# Patient Record
Sex: FEMALE | Race: WHITE | URBAN - METROPOLITAN AREA
[De-identification: names, ages, dates, MRNs, and addresses within clinical notes are randomized per-mention and may not be internally consistent; named-entity substitution may affect disease eponyms.]

---

## 2020-08-19 ENCOUNTER — OFFICE VISIT (OUTPATIENT)
Dept: URBAN - METROPOLITAN AREA TELEHEALTH 2 | Facility: TELEHEALTH | Age: 70
End: 2020-08-19
Payer: MEDICARE

## 2020-08-19 DIAGNOSIS — K21.9 GERD: ICD-10-CM

## 2020-08-19 DIAGNOSIS — K63.89 BACTERIAL OVERGROWTH SYNDROME: ICD-10-CM

## 2020-08-19 DIAGNOSIS — Z85.038 PERSONAL HISTORY OF COLON CANCER: ICD-10-CM

## 2020-08-19 PROCEDURE — 99442 PHONE E/M BY PHYS 11-20 MIN: CPT | Performed by: INTERNAL MEDICINE

## 2020-08-19 PROCEDURE — 99202 OFFICE O/P NEW SF 15 MIN: CPT | Performed by: INTERNAL MEDICINE

## 2020-08-19 RX ORDER — DEXLANSOPRAZOLE 60 MG/1
TAKE 1 CAPSULE BY ORAL ROUTE DAILY FOR 90 DAYS CAPSULE, DELAYED RELEASE ORAL 1
Qty: 90 | Refills: 4 | Status: ACTIVE | COMMUNITY
Start: 2019-11-21 | End: 2021-02-13

## 2020-08-19 RX ORDER — BUPROPION HCL 300 MG
TAKE 1 TABLET (300 MG) BY ORAL ROUTE ONCE DAILY TABLET, EXTENDED RELEASE 24 HR ORAL 1
Qty: 0 | Refills: 0 | Status: ACTIVE | COMMUNITY
Start: 1900-01-01

## 2020-08-19 RX ORDER — ATORVASTATIN CALCIUM 20 MG/1
TABLET, FILM COATED ORAL
Qty: 0 | Refills: 0 | Status: ACTIVE | COMMUNITY
Start: 1900-01-01

## 2020-08-19 RX ORDER — RAMIPRIL 10 MG/1
CAPSULE ORAL
Qty: 0 | Refills: 0 | Status: ACTIVE | COMMUNITY
Start: 1900-01-01

## 2020-08-19 RX ORDER — AMOXICILLIN AND CLAVULANATE POTASSIUM 875; 125 MG/1; MG/1
1 TABLET TABLET, FILM COATED ORAL
Qty: 20 | OUTPATIENT
Start: 2020-08-19 | End: 2020-08-29

## 2020-08-19 NOTE — HPI-TODAY'S VISIT:
70-years-old female presented for evaluation of worsening bloating postprandial epigastric discomfort and Gas, symptoms has worsened over the last 2 years after her right urszula colectomy for colon cancer , I diagnosed her with bacterial overgrowth she has trial of Xifaxan which helped her bloating but she could not tolerate it, she continued to have the symptoms, she has history of Nissen fundoplication twice she has constant intermittent pain in the epigastric area, her last endoscopy revealed looseness and fundoplication, her colonoscopy march 2019 whichw as unremarkable excpet for of previous surgery ( right hemicolectomy )

## 2020-08-26 ENCOUNTER — LAB OUTSIDE AN ENCOUNTER (OUTPATIENT)
Dept: URBAN - METROPOLITAN AREA CLINIC 92 | Facility: CLINIC | Age: 70
End: 2020-08-26

## 2020-08-26 ENCOUNTER — TELEPHONE ENCOUNTER (OUTPATIENT)
Dept: URBAN - METROPOLITAN AREA CLINIC 92 | Facility: CLINIC | Age: 70
End: 2020-08-26

## 2020-08-31 ENCOUNTER — TELEPHONE ENCOUNTER (OUTPATIENT)
Dept: URBAN - METROPOLITAN AREA CLINIC 92 | Facility: CLINIC | Age: 70
End: 2020-08-31

## 2020-08-31 RX ORDER — RIFAXIMIN 550 MG/1
1 TABLET TABLET ORAL THREE TIMES A DAY
Qty: 42 TABLET | OUTPATIENT
Start: 2020-08-31 | End: 2020-09-14

## 2020-11-25 ENCOUNTER — TELEPHONE ENCOUNTER (OUTPATIENT)
Dept: URBAN - METROPOLITAN AREA TELEHEALTH 2 | Facility: TELEHEALTH | Age: 70
End: 2020-11-25

## 2020-11-25 RX ORDER — ONDANSETRON HYDROCHLORIDE 4 MG/1
1 TABLET TABLET, FILM COATED ORAL EVERY 6 HOURS
Qty: 120 TABLET | Refills: 2 | OUTPATIENT
Start: 2020-11-27

## 2021-02-08 ENCOUNTER — ERX REFILL RESPONSE (OUTPATIENT)
Age: 71
End: 2021-02-08

## 2021-02-08 RX ORDER — DEXLANSOPRAZOLE 60 MG/1
TAKE 1 CAPSULE BY MOUTH EVERY DAY CAPSULE, DELAYED RELEASE ORAL
Qty: 90 | Refills: 4

## 2021-03-01 ENCOUNTER — ERX REFILL RESPONSE (OUTPATIENT)
Dept: URBAN - METROPOLITAN AREA CLINIC 23 | Facility: CLINIC | Age: 71
End: 2021-03-01

## 2021-03-01 RX ORDER — FAMOTIDINE 20 MG/1
TAKE 1 TABLET BY MOUTH TWICE A DAY TABLET, FILM COATED ORAL
Qty: 180 | Refills: 1

## 2021-07-02 ENCOUNTER — TELEPHONE ENCOUNTER (OUTPATIENT)
Dept: URBAN - METROPOLITAN AREA CLINIC 23 | Facility: CLINIC | Age: 71
End: 2021-07-02

## 2021-07-02 RX ORDER — SODIUM PICOSULFATE, MAGNESIUM OXIDE, AND ANHYDROUS CITRIC ACID 10; 3.5; 12 MG/160ML; G/160ML; G/160ML
160 ML LIQUID ORAL
Qty: 320 MILLILITER | Refills: 0 | OUTPATIENT
Start: 2021-07-06 | End: 2021-07-07

## 2021-07-05 ENCOUNTER — LAB OUTSIDE AN ENCOUNTER (OUTPATIENT)
Dept: URBAN - METROPOLITAN AREA CLINIC 23 | Facility: CLINIC | Age: 71
End: 2021-07-05

## 2021-07-09 ENCOUNTER — OFFICE VISIT (OUTPATIENT)
Dept: URBAN - METROPOLITAN AREA MEDICAL CENTER 27 | Facility: MEDICAL CENTER | Age: 71
End: 2021-07-09
Payer: MEDICARE

## 2021-07-09 DIAGNOSIS — R93.3 ABN FINDINGS-GI TRACT: ICD-10-CM

## 2021-07-09 DIAGNOSIS — C18.6 MALIGNANT NEOPLASM OF DESCENDING COLON: ICD-10-CM

## 2021-07-09 DIAGNOSIS — Z85.038 H/O COLON CANCER, STAGE I: ICD-10-CM

## 2021-07-09 PROCEDURE — 45331 SIGMOIDOSCOPY AND BIOPSY: CPT | Performed by: INTERNAL MEDICINE

## 2021-07-09 PROCEDURE — 45335 SIGMOIDOSCOPY W/SUBMUC INJ: CPT | Performed by: INTERNAL MEDICINE

## 2021-07-09 RX ORDER — ATORVASTATIN CALCIUM 20 MG/1
TABLET, FILM COATED ORAL
Qty: 0 | Refills: 0 | Status: ACTIVE | COMMUNITY
Start: 1900-01-01

## 2021-07-09 RX ORDER — DEXLANSOPRAZOLE 60 MG/1
TAKE 1 CAPSULE BY MOUTH EVERY DAY CAPSULE, DELAYED RELEASE ORAL
Qty: 90 | Refills: 4 | Status: ACTIVE | COMMUNITY

## 2021-07-09 RX ORDER — BUPROPION HCL 300 MG
TAKE 1 TABLET (300 MG) BY ORAL ROUTE ONCE DAILY TABLET, EXTENDED RELEASE 24 HR ORAL 1
Qty: 0 | Refills: 0 | Status: ACTIVE | COMMUNITY
Start: 1900-01-01

## 2021-07-09 RX ORDER — FAMOTIDINE 20 MG/1
TAKE 1 TABLET BY MOUTH TWICE A DAY TABLET, FILM COATED ORAL
Qty: 180 | Refills: 1 | Status: ACTIVE | COMMUNITY

## 2021-07-09 RX ORDER — ONDANSETRON HYDROCHLORIDE 4 MG/1
1 TABLET TABLET, FILM COATED ORAL EVERY 6 HOURS
Qty: 120 TABLET | Refills: 2 | Status: ACTIVE | COMMUNITY
Start: 2020-11-27

## 2021-07-09 RX ORDER — RAMIPRIL 10 MG/1
CAPSULE ORAL
Qty: 0 | Refills: 0 | Status: ACTIVE | COMMUNITY
Start: 1900-01-01

## 2021-07-13 ENCOUNTER — TELEPHONE ENCOUNTER (OUTPATIENT)
Dept: URBAN - METROPOLITAN AREA CLINIC 23 | Facility: CLINIC | Age: 71
End: 2021-07-13

## 2021-09-07 ENCOUNTER — ERX REFILL RESPONSE (OUTPATIENT)
Dept: URBAN - METROPOLITAN AREA CLINIC 23 | Facility: CLINIC | Age: 71
End: 2021-09-07

## 2021-09-07 RX ORDER — FAMOTIDINE 20 MG/1
TAKE 1 TABLET BY MOUTH TWICE A DAY TABLET, FILM COATED ORAL
Qty: 180 TABLET | Refills: 2 | OUTPATIENT

## 2021-09-07 RX ORDER — FAMOTIDINE 20 MG/1
TAKE 1 TABLET BY MOUTH TWICE A DAY TABLET, FILM COATED ORAL
Qty: 180 | Refills: 1 | OUTPATIENT

## 2021-12-15 ENCOUNTER — TELEPHONE ENCOUNTER (OUTPATIENT)
Dept: URBAN - METROPOLITAN AREA CLINIC 23 | Facility: CLINIC | Age: 71
End: 2021-12-15

## 2021-12-15 RX ORDER — ONDANSETRON HYDROCHLORIDE 4 MG/1
1 TABLET TABLET, FILM COATED ORAL EVERY 6 HOURS
Qty: 360 TABLET | Refills: 1
Start: 2021-12-15

## 2022-02-15 ENCOUNTER — TELEPHONE ENCOUNTER (OUTPATIENT)
Dept: URBAN - METROPOLITAN AREA CLINIC 23 | Facility: CLINIC | Age: 72
End: 2022-02-15

## 2022-02-15 RX ORDER — DEXLANSOPRAZOLE 60 MG/1
TAKE 1 CAPSULE BY MOUTH EVERY DAY CAPSULE, DELAYED RELEASE ORAL
Qty: 90 | Refills: 4

## 2022-05-20 ENCOUNTER — TELEPHONE ENCOUNTER (OUTPATIENT)
Dept: URBAN - METROPOLITAN AREA CLINIC 111 | Facility: CLINIC | Age: 72
End: 2022-05-20

## 2022-05-20 RX ORDER — PANTOPRAZOLE SODIUM 40 MG/1
1 TABLET TABLET, DELAYED RELEASE ORAL TWICE A DAY
Qty: 60 TABLET | Refills: 2 | OUTPATIENT

## 2022-05-20 RX ORDER — FAMOTIDINE 20 MG/1
1 TABLET AT BEDTIME TABLET, FILM COATED ORAL ONCE A DAY
Qty: 30 | Refills: 2 | OUTPATIENT
Start: 2022-05-20

## 2022-06-14 ENCOUNTER — TELEPHONE ENCOUNTER (OUTPATIENT)
Dept: URBAN - METROPOLITAN AREA CLINIC 23 | Facility: CLINIC | Age: 72
End: 2022-06-14

## 2022-06-14 RX ORDER — PANTOPRAZOLE SODIUM 40 MG/1
1 TABLET TABLET, DELAYED RELEASE ORAL TWICE A DAY
Qty: 180 | Refills: 3
Start: 2022-06-14

## 2022-07-13 ENCOUNTER — LAB OUTSIDE AN ENCOUNTER (OUTPATIENT)
Dept: URBAN - METROPOLITAN AREA CLINIC 111 | Facility: CLINIC | Age: 72
End: 2022-07-13

## 2022-07-13 ENCOUNTER — OFFICE VISIT (OUTPATIENT)
Dept: URBAN - METROPOLITAN AREA CLINIC 111 | Facility: CLINIC | Age: 72
End: 2022-07-13
Payer: MEDICARE

## 2022-07-13 VITALS
WEIGHT: 125.6 LBS | DIASTOLIC BLOOD PRESSURE: 61 MMHG | TEMPERATURE: 98.1 F | HEART RATE: 81 BPM | SYSTOLIC BLOOD PRESSURE: 129 MMHG | HEIGHT: 63 IN | BODY MASS INDEX: 22.25 KG/M2

## 2022-07-13 DIAGNOSIS — Z85.038 PERSONAL HISTORY OF COLON CANCER: ICD-10-CM

## 2022-07-13 DIAGNOSIS — K21.9 GERD: ICD-10-CM

## 2022-07-13 DIAGNOSIS — R12 HEARTBURN: ICD-10-CM

## 2022-07-13 PROCEDURE — 99214 OFFICE O/P EST MOD 30 MIN: CPT | Performed by: INTERNAL MEDICINE

## 2022-07-13 RX ORDER — RAMIPRIL 10 MG/1
CAPSULE ORAL
Qty: 0 | Refills: 0 | Status: ACTIVE | COMMUNITY
Start: 1900-01-01

## 2022-07-13 RX ORDER — FAMOTIDINE 20 MG/1
1 TABLET AT BEDTIME TABLET, FILM COATED ORAL ONCE A DAY
Qty: 30 | Refills: 2 | Status: ACTIVE | COMMUNITY
Start: 2022-05-20

## 2022-07-13 RX ORDER — ATORVASTATIN CALCIUM 20 MG/1
TABLET, FILM COATED ORAL
Qty: 0 | Refills: 0 | Status: ACTIVE | COMMUNITY
Start: 1900-01-01

## 2022-07-13 RX ORDER — ONDANSETRON HYDROCHLORIDE 4 MG/1
1 TABLET TABLET, FILM COATED ORAL EVERY 6 HOURS
Qty: 360 TABLET | Refills: 1 | Status: ACTIVE | COMMUNITY
Start: 2021-12-15

## 2022-07-13 RX ORDER — BUPROPION HCL 300 MG
TAKE 1 TABLET (300 MG) BY ORAL ROUTE ONCE DAILY TABLET, EXTENDED RELEASE 24 HR ORAL 1
Qty: 0 | Refills: 0 | Status: ACTIVE | COMMUNITY
Start: 1900-01-01

## 2022-07-13 RX ORDER — PANTOPRAZOLE SODIUM 40 MG/1
1 TABLET TABLET, DELAYED RELEASE ORAL TWICE A DAY
Qty: 180 | Refills: 3
Start: 2022-06-14

## 2022-07-13 RX ORDER — DEXLANSOPRAZOLE 60 MG/1
TAKE 1 CAPSULE BY MOUTH EVERY DAY CAPSULE, DELAYED RELEASE ORAL
Qty: 90 | Refills: 4 | Status: ACTIVE | COMMUNITY

## 2022-07-13 RX ORDER — PANTOPRAZOLE SODIUM 40 MG/1
1 TABLET TABLET, DELAYED RELEASE ORAL TWICE A DAY
Qty: 180 | Refills: 3 | Status: ACTIVE | COMMUNITY
Start: 2022-06-14

## 2022-07-13 RX ORDER — FAMOTIDINE 20 MG/1
TAKE 1 TABLET BY MOUTH TWICE A DAY TABLET, FILM COATED ORAL
Qty: 180 TABLET | Refills: 2 | Status: ACTIVE | COMMUNITY

## 2022-07-13 RX ORDER — FAMOTIDINE 20 MG/1
1 TABLET AT BEDTIME TABLET, FILM COATED ORAL ONCE A DAY
Qty: 30 | Refills: 2
Start: 2022-05-20

## 2022-07-13 NOTE — HPI-TODAY'S VISIT:
72-years-old female presented for worsening grief symptoms heartburn regurgitation and burning sensation in the substernal area she also wants to follow-up for colon cancer.  The patient was diagnosed with colon cancer first in 2018 had first right hemicolectomy last year in July 2021 had another cancer in the descending colon she required a subtotal colectomy, after the surgery she had a prolonged hospitalization.  She got chemotherapy finished her last chemo in March 2022 she is here today for follow-up she still has heartburn nausea bloating.  Her reflux symptoms has worsened she had history of Nissen fundoplication and despite treatment with pantoprazole still has that substernal burning and heartburn

## 2022-07-15 PROBLEM — 15634181000119107 CT OF ABDOMEN ABNORMAL: Status: ACTIVE | Noted: 2022-07-15

## 2022-09-09 ENCOUNTER — TELEPHONE ENCOUNTER (OUTPATIENT)
Dept: URBAN - METROPOLITAN AREA CLINIC 23 | Facility: CLINIC | Age: 72
End: 2022-09-09

## 2022-09-09 ENCOUNTER — OFFICE VISIT (OUTPATIENT)
Dept: URBAN - METROPOLITAN AREA LAB 3 | Facility: LAB | Age: 72
End: 2022-09-09

## 2022-09-09 ENCOUNTER — TELEPHONE ENCOUNTER (OUTPATIENT)
Dept: URBAN - METROPOLITAN AREA CLINIC 6 | Facility: CLINIC | Age: 72
End: 2022-09-09

## 2022-09-09 RX ORDER — ONDANSETRON HYDROCHLORIDE 4 MG/1
1 TABLET TABLET, FILM COATED ORAL EVERY 6 HOURS
Qty: 360 TABLET | Refills: 1 | Status: ACTIVE | COMMUNITY
Start: 2021-12-15

## 2022-09-09 RX ORDER — ATORVASTATIN CALCIUM 20 MG/1
TABLET, FILM COATED ORAL
Qty: 0 | Refills: 0 | Status: ACTIVE | COMMUNITY
Start: 1900-01-01

## 2022-09-09 RX ORDER — DEXLANSOPRAZOLE 60 MG/1
TAKE 1 CAPSULE BY MOUTH EVERY DAY CAPSULE, DELAYED RELEASE ORAL
Qty: 90 | Refills: 4 | Status: ACTIVE | COMMUNITY

## 2022-09-09 RX ORDER — FAMOTIDINE 20 MG/1
1 TABLET AT BEDTIME TABLET, FILM COATED ORAL ONCE A DAY
Qty: 30 | Refills: 2 | Status: ACTIVE | COMMUNITY
Start: 2022-05-20

## 2022-09-09 RX ORDER — FAMOTIDINE 20 MG/1
TAKE 1 TABLET BY MOUTH TWICE A DAY TABLET, FILM COATED ORAL
Qty: 180 TABLET | Refills: 2 | Status: ACTIVE | COMMUNITY

## 2022-09-09 RX ORDER — RAMIPRIL 10 MG/1
CAPSULE ORAL
Qty: 0 | Refills: 0 | Status: ACTIVE | COMMUNITY
Start: 1900-01-01

## 2022-09-09 RX ORDER — PANTOPRAZOLE SODIUM 40 MG/1
1 TABLET TABLET, DELAYED RELEASE ORAL TWICE A DAY
Qty: 180 | Refills: 3 | Status: ACTIVE | COMMUNITY
Start: 2022-06-14

## 2022-09-09 RX ORDER — BUPROPION HCL 300 MG
TAKE 1 TABLET (300 MG) BY ORAL ROUTE ONCE DAILY TABLET, EXTENDED RELEASE 24 HR ORAL 1
Qty: 0 | Refills: 0 | Status: ACTIVE | COMMUNITY
Start: 1900-01-01

## 2022-09-21 ENCOUNTER — TELEPHONE ENCOUNTER (OUTPATIENT)
Dept: URBAN - METROPOLITAN AREA CLINIC 23 | Facility: CLINIC | Age: 72
End: 2022-09-21

## 2022-09-28 ENCOUNTER — TELEPHONE ENCOUNTER (OUTPATIENT)
Dept: URBAN - METROPOLITAN AREA CLINIC 111 | Facility: CLINIC | Age: 72
End: 2022-09-28

## 2022-09-29 ENCOUNTER — TELEPHONE ENCOUNTER (OUTPATIENT)
Dept: URBAN - METROPOLITAN AREA CLINIC 23 | Facility: CLINIC | Age: 72
End: 2022-09-29

## 2022-10-04 PROBLEM — 16331000: Status: ACTIVE | Noted: 2022-07-15

## 2022-10-04 PROBLEM — 235595009 GASTROESOPHAGEAL REFLUX DISEASE: Status: ACTIVE | Noted: 2022-07-13

## 2022-10-18 ENCOUNTER — TELEPHONE ENCOUNTER (OUTPATIENT)
Dept: URBAN - METROPOLITAN AREA CLINIC 111 | Facility: CLINIC | Age: 72
End: 2022-10-18

## 2022-10-19 ENCOUNTER — TELEPHONE ENCOUNTER (OUTPATIENT)
Dept: URBAN - METROPOLITAN AREA CLINIC 92 | Facility: CLINIC | Age: 72
End: 2022-10-19

## 2022-10-19 ENCOUNTER — OFFICE VISIT (OUTPATIENT)
Dept: URBAN - METROPOLITAN AREA LAB 3 | Facility: LAB | Age: 72
End: 2022-10-19
Payer: MEDICARE

## 2022-10-19 DIAGNOSIS — K29.30 CHRONIC SUPERFICIAL GASTRITIS: ICD-10-CM

## 2022-10-19 DIAGNOSIS — Z85.038 H/O COLON CANCER, STAGE I: ICD-10-CM

## 2022-10-19 DIAGNOSIS — K63.5 BENIGN COLON POLYP: ICD-10-CM

## 2022-10-19 DIAGNOSIS — K22.89 DILATATION OF ESOPHAGUS: ICD-10-CM

## 2022-10-19 PROCEDURE — 45338 SIGMOIDOSCOPY W/TUMR REMOVE: CPT | Performed by: INTERNAL MEDICINE

## 2022-10-19 PROCEDURE — 43239 EGD BIOPSY SINGLE/MULTIPLE: CPT | Performed by: INTERNAL MEDICINE

## 2022-10-19 RX ORDER — BUPROPION HCL 300 MG
TAKE 1 TABLET (300 MG) BY ORAL ROUTE ONCE DAILY TABLET, EXTENDED RELEASE 24 HR ORAL 1
Qty: 0 | Refills: 0 | Status: ACTIVE | COMMUNITY
Start: 1900-01-01

## 2022-10-19 RX ORDER — ATORVASTATIN CALCIUM 20 MG/1
TABLET, FILM COATED ORAL
Qty: 0 | Refills: 0 | Status: ACTIVE | COMMUNITY
Start: 1900-01-01

## 2022-10-19 RX ORDER — FAMOTIDINE 20 MG/1
1 TABLET AT BEDTIME TABLET, FILM COATED ORAL ONCE A DAY
Qty: 30 | Refills: 2 | Status: ACTIVE | COMMUNITY
Start: 2022-05-20

## 2022-10-19 RX ORDER — RAMIPRIL 10 MG/1
CAPSULE ORAL
Qty: 0 | Refills: 0 | Status: ACTIVE | COMMUNITY
Start: 1900-01-01

## 2022-10-19 RX ORDER — DEXLANSOPRAZOLE 60 MG/1
TAKE 1 CAPSULE BY MOUTH EVERY DAY CAPSULE, DELAYED RELEASE ORAL
Qty: 90 | Refills: 4 | Status: ACTIVE | COMMUNITY

## 2022-10-19 RX ORDER — COLESEVELAM HYDROCHLORIDE 625 MG/1
625 MG PO TABLET, FILM COATED ORAL TWICE A DAY
Qty: 28 | Refills: 3 | OUTPATIENT
Start: 2022-10-19

## 2022-10-19 RX ORDER — PANTOPRAZOLE SODIUM 40 MG/1
1 TABLET TABLET, DELAYED RELEASE ORAL TWICE A DAY
Qty: 180 | Refills: 3 | Status: ACTIVE | COMMUNITY
Start: 2022-06-14

## 2022-10-19 RX ORDER — FAMOTIDINE 20 MG/1
TAKE 1 TABLET BY MOUTH TWICE A DAY TABLET, FILM COATED ORAL
Qty: 180 TABLET | Refills: 2 | Status: ACTIVE | COMMUNITY

## 2022-10-19 RX ORDER — ONDANSETRON HYDROCHLORIDE 4 MG/1
1 TABLET TABLET, FILM COATED ORAL EVERY 6 HOURS
Qty: 360 TABLET | Refills: 1 | Status: ACTIVE | COMMUNITY
Start: 2021-12-15

## 2022-10-25 ENCOUNTER — WEB ENCOUNTER (OUTPATIENT)
Dept: URBAN - METROPOLITAN AREA CLINIC 23 | Facility: CLINIC | Age: 72
End: 2022-10-25

## 2022-12-08 ENCOUNTER — TELEPHONE ENCOUNTER (OUTPATIENT)
Dept: URBAN - METROPOLITAN AREA CLINIC 23 | Facility: CLINIC | Age: 72
End: 2022-12-08

## 2022-12-08 RX ORDER — RAMIPRIL 10 MG/1
CAPSULE ORAL
Qty: 0 | Refills: 0 | Status: ACTIVE | COMMUNITY
Start: 1900-01-01

## 2022-12-08 RX ORDER — ATORVASTATIN CALCIUM 20 MG/1
TABLET, FILM COATED ORAL
Qty: 0 | Refills: 0 | Status: ACTIVE | COMMUNITY
Start: 1900-01-01

## 2022-12-08 RX ORDER — FAMOTIDINE 20 MG/1
TAKE 1 TABLET BY MOUTH TWICE A DAY TABLET, FILM COATED ORAL
Qty: 180 TABLET | Refills: 2 | Status: ACTIVE | COMMUNITY

## 2022-12-08 RX ORDER — FAMOTIDINE 20 MG/1
1 TABLET AT BEDTIME TABLET, FILM COATED ORAL ONCE A DAY
Qty: 30 | Refills: 2 | Status: ACTIVE | COMMUNITY
Start: 2022-05-20

## 2022-12-08 RX ORDER — PANTOPRAZOLE SODIUM 40 MG/1
1 TABLET TABLET, DELAYED RELEASE ORAL TWICE A DAY
Qty: 180 | Refills: 3 | Status: ACTIVE | COMMUNITY
Start: 2022-06-14

## 2022-12-08 RX ORDER — HYOSCYAMINE SULFATE 0.12 MG/1
1 TABLET AS NEEDED TABLET ORAL
Qty: 60 | Refills: 3 | OUTPATIENT
Start: 2022-12-09 | End: 2023-04-08

## 2022-12-08 RX ORDER — DEXLANSOPRAZOLE 60 MG/1
TAKE 1 CAPSULE BY MOUTH EVERY DAY CAPSULE, DELAYED RELEASE ORAL
Qty: 90 | Refills: 4 | Status: ACTIVE | COMMUNITY

## 2022-12-08 RX ORDER — BUPROPION HCL 300 MG
TAKE 1 TABLET (300 MG) BY ORAL ROUTE ONCE DAILY TABLET, EXTENDED RELEASE 24 HR ORAL 1
Qty: 0 | Refills: 0 | Status: ACTIVE | COMMUNITY
Start: 1900-01-01

## 2022-12-08 RX ORDER — ONDANSETRON HYDROCHLORIDE 4 MG/1
1 TABLET TABLET, FILM COATED ORAL EVERY 6 HOURS
Qty: 360 TABLET | Refills: 1 | Status: ACTIVE | COMMUNITY
Start: 2021-12-15

## 2022-12-08 RX ORDER — COLESEVELAM HYDROCHLORIDE 625 MG/1
625 MG PO TABLET, FILM COATED ORAL TWICE A DAY
Qty: 28 | Refills: 3 | Status: ACTIVE | COMMUNITY
Start: 2022-10-19

## 2022-12-09 ENCOUNTER — OFFICE VISIT (OUTPATIENT)
Dept: URBAN - METROPOLITAN AREA LAB 3 | Facility: LAB | Age: 72
End: 2022-12-09

## 2022-12-09 ENCOUNTER — TELEPHONE ENCOUNTER (OUTPATIENT)
Dept: URBAN - METROPOLITAN AREA CLINIC 23 | Facility: CLINIC | Age: 72
End: 2022-12-09

## 2022-12-09 PROBLEM — 429699009 HISTORY OF MALIGNANT NEOPLASM OF COLON: Status: ACTIVE | Noted: 2022-07-13

## 2022-12-13 ENCOUNTER — TELEPHONE ENCOUNTER (OUTPATIENT)
Dept: URBAN - METROPOLITAN AREA CLINIC 111 | Facility: CLINIC | Age: 72
End: 2022-12-13

## 2022-12-14 ENCOUNTER — LAB OUTSIDE AN ENCOUNTER (OUTPATIENT)
Dept: URBAN - METROPOLITAN AREA CLINIC 23 | Facility: CLINIC | Age: 72
End: 2022-12-14

## 2022-12-20 LAB
CALPROTECTIN, STOOL - QDX: (no result)
GASTROINTESTINAL PATHOGEN: (no result)

## 2022-12-23 ENCOUNTER — LAB OUTSIDE AN ENCOUNTER (OUTPATIENT)
Dept: URBAN - METROPOLITAN AREA CLINIC 23 | Facility: CLINIC | Age: 72
End: 2022-12-23

## 2022-12-23 LAB
CREATININE POC: 0.7
PERFORMING LAB: (no result)

## 2022-12-27 ENCOUNTER — WEB ENCOUNTER (OUTPATIENT)
Dept: URBAN - METROPOLITAN AREA CLINIC 23 | Facility: CLINIC | Age: 72
End: 2022-12-27

## 2023-04-07 ENCOUNTER — OFFICE VISIT (OUTPATIENT)
Dept: URBAN - METROPOLITAN AREA CLINIC 23 | Facility: CLINIC | Age: 73
End: 2023-04-07
Payer: MEDICARE

## 2023-04-07 ENCOUNTER — WEB ENCOUNTER (OUTPATIENT)
Dept: URBAN - METROPOLITAN AREA CLINIC 23 | Facility: CLINIC | Age: 73
End: 2023-04-07

## 2023-04-07 VITALS
BODY MASS INDEX: 23.04 KG/M2 | HEART RATE: 82 BPM | HEIGHT: 63 IN | WEIGHT: 130 LBS | DIASTOLIC BLOOD PRESSURE: 65 MMHG | SYSTOLIC BLOOD PRESSURE: 134 MMHG | TEMPERATURE: 98.8 F

## 2023-04-07 DIAGNOSIS — Z85.038 PERSONAL HISTORY OF COLON CANCER: ICD-10-CM

## 2023-04-07 DIAGNOSIS — D50.0 IRON DEFICIENCY ANEMIA DUE TO CHRONIC BLOOD LOSS: ICD-10-CM

## 2023-04-07 DIAGNOSIS — R19.7 DIARRHEA: ICD-10-CM

## 2023-04-07 DIAGNOSIS — R10.13 EPIGASTRIC PAIN: ICD-10-CM

## 2023-04-07 PROCEDURE — 99214 OFFICE O/P EST MOD 30 MIN: CPT | Performed by: INTERNAL MEDICINE

## 2023-04-07 RX ORDER — FAMOTIDINE 20 MG/1
1 TABLET AT BEDTIME TABLET, FILM COATED ORAL ONCE A DAY
Qty: 30 | Refills: 2 | Status: ACTIVE | COMMUNITY
Start: 2022-05-20

## 2023-04-07 RX ORDER — FAMOTIDINE 20 MG/1
TAKE 1 TABLET BY MOUTH TWICE A DAY TABLET, FILM COATED ORAL
Qty: 180 TABLET | Refills: 2 | Status: ACTIVE | COMMUNITY

## 2023-04-07 RX ORDER — PANTOPRAZOLE SODIUM 40 MG/1
1 TABLET TABLET, DELAYED RELEASE ORAL TWICE A DAY
Qty: 180 | Refills: 3 | Status: ACTIVE | COMMUNITY
Start: 2022-06-14

## 2023-04-07 RX ORDER — ATORVASTATIN CALCIUM 20 MG/1
TABLET, FILM COATED ORAL
Qty: 0 | Refills: 0 | Status: ACTIVE | COMMUNITY
Start: 1900-01-01

## 2023-04-07 RX ORDER — DEXLANSOPRAZOLE 60 MG/1
TAKE 1 CAPSULE BY MOUTH EVERY DAY CAPSULE, DELAYED RELEASE ORAL
Qty: 90 | Refills: 4 | Status: ACTIVE | COMMUNITY

## 2023-04-07 RX ORDER — COLESEVELAM HYDROCHLORIDE 625 MG/1
625 MG PO TABLET, FILM COATED ORAL TWICE A DAY
Qty: 28 | Refills: 3 | Status: ACTIVE | COMMUNITY
Start: 2022-10-19

## 2023-04-07 RX ORDER — HYOSCYAMINE SULFATE 0.12 MG/1
1 TABLET AS NEEDED TABLET ORAL
Qty: 60 | Refills: 3 | Status: ACTIVE | COMMUNITY
Start: 2022-12-09 | End: 2023-04-08

## 2023-04-07 RX ORDER — ONDANSETRON HYDROCHLORIDE 4 MG/1
1 TABLET TABLET, FILM COATED ORAL EVERY 6 HOURS
Qty: 360 TABLET | Refills: 1 | Status: ACTIVE | COMMUNITY
Start: 2021-12-15

## 2023-04-07 RX ORDER — BUPROPION HCL 300 MG
TAKE 1 TABLET (300 MG) BY ORAL ROUTE ONCE DAILY TABLET, EXTENDED RELEASE 24 HR ORAL 1
Qty: 0 | Refills: 0 | Status: ACTIVE | COMMUNITY
Start: 1900-01-01

## 2023-04-07 RX ORDER — RAMIPRIL 10 MG/1
CAPSULE ORAL
Qty: 0 | Refills: 0 | Status: ACTIVE | COMMUNITY
Start: 1900-01-01

## 2023-04-07 NOTE — HPI-TODAY'S VISIT:
73 years-old female presented for follow-up for chronic diarrhea epigastric abdominal pain and history of colon cancer.  She had EGD and flexible sigmoidoscopy October 2022 her upper endoscopy revealed evidence of Nissen fundoplication gastritis biopsy no evidence of H. pylori.  Her flexible sigmoidoscopy showed normal ileal sigmoid anastomosis small hyperplastic polyp.  She is here today for follow-up she feels weak tired fatigue worry about recurrent anemia.  She has diagnosis of 2 cancers both presented with iron deficiency anemia first diagnosed in 2018 had right hemicolectomy within July 2021 presented with another mass in the descending colon require subtotal colectomy.  She had chemo therapy to treat the cancer her last dose March 2022 she is following with Dr. Talley no recurrent tumor on imaging.  she had history of Nissen fundoplication and despite treatment with pantoprazole still has that substernal burning and heartburn I gave her last visit trial of WelChol for diarrhea and she it helped her she is using it only as needed

## 2023-04-10 ENCOUNTER — WEB ENCOUNTER (OUTPATIENT)
Dept: URBAN - METROPOLITAN AREA CLINIC 23 | Facility: CLINIC | Age: 73
End: 2023-04-10

## 2023-04-10 LAB
ABSOLUTE BASOPHILS: 52
ABSOLUTE EOSINOPHILS: 99
ABSOLUTE LYMPHOCYTES: 1815
ABSOLUTE MONOCYTES: 679
ABSOLUTE NEUTROPHILS: 3155
ALBUMIN/GLOBULIN RATIO: 1.9
ALBUMIN: 4.2
ALKALINE PHOSPHATASE: 65
ALT: 13
AST: 20
BASOPHILS: 0.9
BILIRUBIN, TOTAL: 1
BUN/CREATININE RATIO: (no result)
C-REACTIVE PROTEIN, QUANT: 0.8
CALCIUM: 8.7
CARBON DIOXIDE: 26
CHLORIDE: 105
CREATININE: 0.8
EOSINOPHILS: 1.7
FERRITIN, SERUM: 76
GLOBULIN: 2.2
GLUCOSE: 60
HEMATOCRIT: 35.6
HEMOGLOBIN: 12
IRON BIND.CAP.(TIBC): 343
IRON SATURATION: 24
IRON: 82
LYMPHOCYTES: 31.3
MCH: 30.5
MCHC: 33.7
MCV: 90.6
MONOCYTES: 11.7
MPV: 10
NEUTROPHILS: 54.4
PLATELET COUNT: 199
POTASSIUM: 4.4
PROTEIN, TOTAL: 6.4
RDW: 12.4
RED BLOOD CELL COUNT: 3.93
SODIUM: 140
UREA NITROGEN (BUN): 9
WHITE BLOOD CELL COUNT: 5.8

## 2023-10-09 ENCOUNTER — TELEPHONE ENCOUNTER (OUTPATIENT)
Dept: URBAN - METROPOLITAN AREA CLINIC 23 | Facility: CLINIC | Age: 73
End: 2023-10-09

## 2023-10-09 RX ORDER — PANTOPRAZOLE SODIUM 40 MG/1
1 TABLET TABLET, DELAYED RELEASE ORAL TWICE A DAY
Qty: 180 | Refills: 3
Start: 2023-10-09

## 2023-12-19 ENCOUNTER — OFFICE VISIT (OUTPATIENT)
Dept: URBAN - METROPOLITAN AREA CLINIC 23 | Facility: CLINIC | Age: 73
End: 2023-12-19

## 2024-03-15 ENCOUNTER — OV EP (OUTPATIENT)
Dept: URBAN - METROPOLITAN AREA CLINIC 23 | Facility: CLINIC | Age: 74
End: 2024-03-15
Payer: MEDICARE

## 2024-03-15 ENCOUNTER — LAB (OUTPATIENT)
Dept: URBAN - METROPOLITAN AREA CLINIC 23 | Facility: CLINIC | Age: 74
End: 2024-03-15

## 2024-03-15 VITALS
SYSTOLIC BLOOD PRESSURE: 141 MMHG | BODY MASS INDEX: 25.16 KG/M2 | DIASTOLIC BLOOD PRESSURE: 69 MMHG | HEIGHT: 63 IN | WEIGHT: 142 LBS | TEMPERATURE: 98.6 F | HEART RATE: 96 BPM

## 2024-03-15 DIAGNOSIS — Z85.038 PERSONAL HISTORY OF COLON CANCER: ICD-10-CM

## 2024-03-15 DIAGNOSIS — R11.0 NAUSEA: ICD-10-CM

## 2024-03-15 DIAGNOSIS — R10.13 EPIGASTRIC PAIN: ICD-10-CM

## 2024-03-15 DIAGNOSIS — R19.7 DIARRHEA: ICD-10-CM

## 2024-03-15 DIAGNOSIS — K21.9 CHRONIC GERD: ICD-10-CM

## 2024-03-15 PROBLEM — 235595009: Status: ACTIVE | Noted: 2024-03-15

## 2024-03-15 PROCEDURE — 99214 OFFICE O/P EST MOD 30 MIN: CPT | Performed by: INTERNAL MEDICINE

## 2024-03-15 RX ORDER — BUPROPION HCL 300 MG
TAKE 1 TABLET (300 MG) BY ORAL ROUTE ONCE DAILY TABLET, EXTENDED RELEASE 24 HR ORAL 1
Qty: 0 | Refills: 0 | Status: ACTIVE | COMMUNITY
Start: 1900-01-01

## 2024-03-15 RX ORDER — PANTOPRAZOLE SODIUM 40 MG/1
1 TABLET TABLET, DELAYED RELEASE ORAL TWICE A DAY
Qty: 180 | Refills: 3 | Status: ACTIVE | COMMUNITY
Start: 2023-10-09

## 2024-03-15 RX ORDER — FAMOTIDINE 20 MG/1
1 TABLET AT BEDTIME TABLET, FILM COATED ORAL ONCE A DAY
Qty: 30 | Refills: 2 | Status: ACTIVE | COMMUNITY
Start: 2022-05-20

## 2024-03-15 RX ORDER — ATORVASTATIN CALCIUM 20 MG/1
TABLET, FILM COATED ORAL
Qty: 0 | Refills: 0 | Status: ACTIVE | COMMUNITY
Start: 1900-01-01

## 2024-03-15 RX ORDER — ONDANSETRON HYDROCHLORIDE 4 MG/1
1 TABLET TABLET, FILM COATED ORAL EVERY 6 HOURS
Qty: 360 TABLET | Refills: 1 | Status: ACTIVE | COMMUNITY
Start: 2021-12-15

## 2024-03-15 RX ORDER — RAMIPRIL 10 MG/1
CAPSULE ORAL
Qty: 0 | Refills: 0 | Status: ACTIVE | COMMUNITY
Start: 1900-01-01

## 2024-03-15 RX ORDER — FAMOTIDINE 20 MG/1
TAKE 1 TABLET BY MOUTH TWICE A DAY TABLET, FILM COATED ORAL
Qty: 180 TABLET | Refills: 2 | Status: ACTIVE | COMMUNITY

## 2024-03-15 RX ORDER — DEXLANSOPRAZOLE 60 MG/1
TAKE 1 CAPSULE BY MOUTH EVERY DAY CAPSULE, DELAYED RELEASE ORAL
Qty: 90 | Refills: 4 | Status: ACTIVE | COMMUNITY

## 2024-03-15 RX ORDER — COLESEVELAM HYDROCHLORIDE 625 MG/1
625 MG PO TABLET, FILM COATED ORAL TWICE A DAY
Qty: 28 | Refills: 3 | Status: ACTIVE | COMMUNITY
Start: 2022-10-19

## 2024-03-15 RX ORDER — DEXLANSOPRAZOLE 60 MG/1
1 CAPSULE CAPSULE, DELAYED RELEASE ORAL ONCE A DAY
Qty: 90 CAPSULE | Refills: 3 | OUTPATIENT
Start: 2024-03-15

## 2024-03-15 NOTE — HPI-TODAY'S VISIT:
74 years-old female presented for follow-up for chronic diarrhea epigastric abdominal pain and history of colon cancer. Her primary health concerns are centered around upper gastrointestinal symptoms, with a particular emphasis on nausea and gas. She confirms the absence of blood in her stool, she had 4-5 bowel movement with the nausea she is taking WelChol with mild improvement in her diarrhea Additionally, the patient addresses her need for a new prescription for Dexilant, as she has depleted her current supply. She is exploring the possibility of supplementing Dexilant with another medication to alleviate breakthrough reflux symptoms which has worsened recently especially later in the day. The patient's medical history is significant for chemotherapy and colon cancer, with genetic testing revealing negative results. She also notes that her sister is currently undergoing genetic testing due to health concerns. Despite not experiencing weight loss, she eports tenderness in her upper abdominal area. Her most recent imaging was conducted in the fall,   She had EGD and flexible sigmoidoscopy October 2022 her upper endoscopy revealed evidence of Nissen fundoplication gastritis biopsy no evidence of H. pylori.  Her flexible sigmoidoscopy showed normal ileal sigmoid anastomosis small hyperplastic polyp. She has diagnosis of 2 cancers both presented with iron deficiency anemia first diagnosed in 2018 had right hemicolectomy within July 2021 presented with another mass in the descending colon require subtotal colectomy.  She had chemo therapy to treat the cancer her last dose March 2022 she is following with Dr. Talley no recurrent tumor on imaging.  she had history of Nissen fundoplication and despite treatment with pantoprazole still has that substernal burning and heartburn I

## 2024-05-01 ENCOUNTER — TELEPHONE ENCOUNTER (OUTPATIENT)
Dept: URBAN - METROPOLITAN AREA CLINIC 23 | Facility: CLINIC | Age: 74
End: 2024-05-01

## 2024-05-01 ENCOUNTER — OFFICE VISIT (OUTPATIENT)
Dept: URBAN - METROPOLITAN AREA LAB 3 | Facility: LAB | Age: 74
End: 2024-05-01

## 2024-05-24 ENCOUNTER — TELEPHONE ENCOUNTER (OUTPATIENT)
Dept: URBAN - METROPOLITAN AREA CLINIC 23 | Facility: CLINIC | Age: 74
End: 2024-05-24

## 2024-06-19 ENCOUNTER — OFFICE VISIT (OUTPATIENT)
Dept: URBAN - METROPOLITAN AREA LAB 3 | Facility: LAB | Age: 74
End: 2024-06-19

## 2024-07-02 ENCOUNTER — TELEPHONE ENCOUNTER (OUTPATIENT)
Dept: URBAN - METROPOLITAN AREA CLINIC 23 | Facility: CLINIC | Age: 74
End: 2024-07-02

## 2024-07-10 ENCOUNTER — OFFICE VISIT (OUTPATIENT)
Dept: URBAN - METROPOLITAN AREA LAB 3 | Facility: LAB | Age: 74
End: 2024-07-10
Payer: MEDICARE

## 2024-07-10 DIAGNOSIS — Z85.038 H/O COLON CANCER, STAGE I: ICD-10-CM

## 2024-07-10 DIAGNOSIS — K29.60 ADENOPAPILLOMATOSIS GASTRICA: ICD-10-CM

## 2024-07-10 DIAGNOSIS — K22.89 OTHER SPECIFIED DISEASE OF ESOPHAGUS: ICD-10-CM

## 2024-07-10 DIAGNOSIS — R11.0 AM NAUSEA: ICD-10-CM

## 2024-07-10 PROCEDURE — 43239 EGD BIOPSY SINGLE/MULTIPLE: CPT | Performed by: INTERNAL MEDICINE

## 2024-07-10 PROCEDURE — 45330 DIAGNOSTIC SIGMOIDOSCOPY: CPT | Performed by: INTERNAL MEDICINE

## 2024-07-10 RX ORDER — BUPROPION HCL 300 MG
TAKE 1 TABLET (300 MG) BY ORAL ROUTE ONCE DAILY TABLET, EXTENDED RELEASE 24 HR ORAL 1
Qty: 0 | Refills: 0 | Status: ACTIVE | COMMUNITY
Start: 1900-01-01

## 2024-07-10 RX ORDER — FAMOTIDINE 20 MG/1
TAKE 1 TABLET BY MOUTH TWICE A DAY TABLET, FILM COATED ORAL
Qty: 180 TABLET | Refills: 2 | Status: ACTIVE | COMMUNITY

## 2024-07-10 RX ORDER — DIPHENOXYLATE HYDROCHLORIDE AND ATROPINE SULFATE 2.5; .025 MG/1; MG/1
1 TABLET AS NEEDED TABLET ORAL
Qty: 360 TABLET | Refills: 1 | Status: ACTIVE | COMMUNITY
Start: 2024-04-17 | End: 2024-10-14

## 2024-07-10 RX ORDER — DEXLANSOPRAZOLE 60 MG/1
TAKE 1 CAPSULE BY MOUTH EVERY DAY CAPSULE, DELAYED RELEASE ORAL
Qty: 90 | Refills: 4 | Status: ACTIVE | COMMUNITY

## 2024-07-10 RX ORDER — COLESEVELAM HYDROCHLORIDE 625 MG/1
625 MG PO TABLET, FILM COATED ORAL TWICE A DAY
Qty: 28 | Refills: 3 | Status: ACTIVE | COMMUNITY
Start: 2022-10-19

## 2024-07-10 RX ORDER — ATORVASTATIN CALCIUM 20 MG/1
TABLET, FILM COATED ORAL
Qty: 0 | Refills: 0 | Status: ACTIVE | COMMUNITY
Start: 1900-01-01

## 2024-07-10 RX ORDER — DEXLANSOPRAZOLE 60 MG/1
1 CAPSULE CAPSULE, DELAYED RELEASE ORAL ONCE A DAY
Qty: 90 CAPSULE | Refills: 3 | Status: ACTIVE | COMMUNITY
Start: 2024-03-15

## 2024-07-10 RX ORDER — RAMIPRIL 10 MG/1
CAPSULE ORAL
Qty: 0 | Refills: 0 | Status: ACTIVE | COMMUNITY
Start: 1900-01-01

## 2024-07-10 RX ORDER — FAMOTIDINE 20 MG/1
1 TABLET AT BEDTIME TABLET, FILM COATED ORAL ONCE A DAY
Qty: 30 | Refills: 2 | Status: ACTIVE | COMMUNITY
Start: 2022-05-20

## 2024-07-10 RX ORDER — ONDANSETRON HYDROCHLORIDE 4 MG/1
1 TABLET TABLET, FILM COATED ORAL EVERY 6 HOURS
Qty: 360 TABLET | Refills: 1 | Status: ACTIVE | COMMUNITY
Start: 2021-12-15

## 2024-07-10 RX ORDER — PANTOPRAZOLE SODIUM 40 MG/1
1 TABLET TABLET, DELAYED RELEASE ORAL TWICE A DAY
Qty: 180 | Refills: 3 | Status: ACTIVE | COMMUNITY
Start: 2023-10-09

## 2024-12-10 ENCOUNTER — OFFICE VISIT (OUTPATIENT)
Dept: URBAN - METROPOLITAN AREA CLINIC 23 | Facility: CLINIC | Age: 74
End: 2024-12-10

## 2024-12-10 RX ORDER — DEXLANSOPRAZOLE 60 MG/1
TAKE 1 CAPSULE BY MOUTH EVERY DAY CAPSULE, DELAYED RELEASE ORAL
Qty: 90 | Refills: 4 | Status: ACTIVE | COMMUNITY

## 2024-12-10 RX ORDER — ONDANSETRON HYDROCHLORIDE 4 MG/1
1 TABLET TABLET, FILM COATED ORAL EVERY 6 HOURS
Qty: 360 TABLET | Refills: 1 | Status: ACTIVE | COMMUNITY
Start: 2021-12-15

## 2024-12-10 RX ORDER — COLESEVELAM HYDROCHLORIDE 625 MG/1
625 MG PO TABLET, FILM COATED ORAL TWICE A DAY
Qty: 28 | Refills: 3 | Status: ACTIVE | COMMUNITY
Start: 2022-10-19

## 2024-12-10 RX ORDER — RAMIPRIL 10 MG/1
CAPSULE ORAL
Qty: 0 | Refills: 0 | Status: ACTIVE | COMMUNITY
Start: 1900-01-01

## 2024-12-10 RX ORDER — PANTOPRAZOLE SODIUM 40 MG/1
1 TABLET TABLET, DELAYED RELEASE ORAL TWICE A DAY
Qty: 180 | Refills: 3 | Status: ACTIVE | COMMUNITY
Start: 2023-10-09

## 2024-12-10 RX ORDER — ATORVASTATIN CALCIUM 20 MG/1
TABLET, FILM COATED ORAL
Qty: 0 | Refills: 0 | Status: ACTIVE | COMMUNITY
Start: 1900-01-01

## 2024-12-10 RX ORDER — DEXLANSOPRAZOLE 60 MG/1
1 CAPSULE CAPSULE, DELAYED RELEASE ORAL ONCE A DAY
Qty: 90 CAPSULE | Refills: 3 | Status: ACTIVE | COMMUNITY
Start: 2024-03-15

## 2024-12-10 RX ORDER — BUPROPION HCL 300 MG
TAKE 1 TABLET (300 MG) BY ORAL ROUTE ONCE DAILY TABLET, EXTENDED RELEASE 24 HR ORAL 1
Qty: 0 | Refills: 0 | Status: ACTIVE | COMMUNITY
Start: 1900-01-01

## 2024-12-10 RX ORDER — FAMOTIDINE 20 MG/1
1 TABLET AT BEDTIME TABLET, FILM COATED ORAL ONCE A DAY
Qty: 30 | Refills: 2 | Status: ACTIVE | COMMUNITY
Start: 2022-05-20

## 2024-12-10 RX ORDER — FAMOTIDINE 20 MG/1
TAKE 1 TABLET BY MOUTH TWICE A DAY TABLET, FILM COATED ORAL
Qty: 180 TABLET | Refills: 2 | Status: ACTIVE | COMMUNITY

## 2025-01-07 ENCOUNTER — ERX REFILL RESPONSE (OUTPATIENT)
Dept: URBAN - METROPOLITAN AREA CLINIC 23 | Facility: CLINIC | Age: 75
End: 2025-01-07

## 2025-01-07 RX ORDER — PANTOPRAZOLE SODIUM 40 MG/1
TAKE 1 TABLET BY MOUTH TWICE A DAY FOR 90 DAYS TABLET, DELAYED RELEASE ORAL
Qty: 180 TABLET | Refills: 3 | OUTPATIENT

## 2025-01-07 RX ORDER — PANTOPRAZOLE SODIUM 40 MG/1
1 TABLET TABLET, DELAYED RELEASE ORAL TWICE A DAY
Qty: 180 | Refills: 3 | OUTPATIENT

## 2025-01-31 ENCOUNTER — OFFICE VISIT (OUTPATIENT)
Dept: URBAN - METROPOLITAN AREA CLINIC 23 | Facility: CLINIC | Age: 75
End: 2025-01-31

## 2025-01-31 ENCOUNTER — DASHBOARD ENCOUNTERS (OUTPATIENT)
Age: 75
End: 2025-01-31

## 2025-01-31 VITALS
WEIGHT: 146 LBS | HEART RATE: 81 BPM | HEIGHT: 63 IN | SYSTOLIC BLOOD PRESSURE: 137 MMHG | DIASTOLIC BLOOD PRESSURE: 77 MMHG | TEMPERATURE: 98 F | BODY MASS INDEX: 25.87 KG/M2

## 2025-01-31 RX ORDER — FAMOTIDINE 20 MG/1
TAKE 1 TABLET BY MOUTH TWICE A DAY TABLET, FILM COATED ORAL
Qty: 180 TABLET | Refills: 2 | Status: ACTIVE | COMMUNITY

## 2025-01-31 RX ORDER — ONDANSETRON HYDROCHLORIDE 4 MG/1
1 TABLET TABLET, FILM COATED ORAL EVERY 6 HOURS
Qty: 360 TABLET | Refills: 1 | Status: ACTIVE | COMMUNITY
Start: 2021-12-15

## 2025-01-31 RX ORDER — ATORVASTATIN CALCIUM 20 MG/1
TABLET, FILM COATED ORAL
Qty: 0 | Refills: 0 | Status: ACTIVE | COMMUNITY

## 2025-01-31 RX ORDER — DEXLANSOPRAZOLE 60 MG/1
TAKE 1 CAPSULE BY MOUTH EVERY DAY CAPSULE, DELAYED RELEASE ORAL
Qty: 90 | Refills: 4 | Status: ACTIVE | COMMUNITY

## 2025-01-31 RX ORDER — BUPROPION HCL 300 MG
TAKE 1 TABLET (300 MG) BY ORAL ROUTE ONCE DAILY TABLET, EXTENDED RELEASE 24 HR ORAL 1
Qty: 0 | Refills: 0 | Status: ACTIVE | COMMUNITY

## 2025-01-31 RX ORDER — DIPHENOXYLATE HYDROCHLORIDE AND ATROPINE SULFATE 2.5; .025 MG/1; MG/1
1 TABLET AS NEEDED TABLET ORAL
Qty: 360 TABLET | Refills: 2 | OUTPATIENT
Start: 2025-01-31 | End: 2025-10-28

## 2025-01-31 RX ORDER — RAMIPRIL 10 MG/1
CAPSULE ORAL
Qty: 0 | Refills: 0 | Status: ACTIVE | COMMUNITY

## 2025-01-31 RX ORDER — DEXLANSOPRAZOLE 60 MG/1
1 CAPSULE CAPSULE, DELAYED RELEASE ORAL ONCE A DAY
Qty: 90 CAPSULE | Refills: 3 | Status: ACTIVE | COMMUNITY
Start: 2024-03-15

## 2025-01-31 RX ORDER — PANTOPRAZOLE SODIUM 40 MG/1
TAKE 1 TABLET BY MOUTH TWICE A DAY FOR 90 DAYS TABLET, DELAYED RELEASE ORAL
Qty: 180 TABLET | Refills: 3 | Status: ACTIVE | COMMUNITY

## 2025-01-31 RX ORDER — FAMOTIDINE 20 MG/1
1 TABLET AT BEDTIME TABLET, FILM COATED ORAL ONCE A DAY
Qty: 30 | Refills: 2 | Status: ACTIVE | COMMUNITY
Start: 2022-05-20

## 2025-01-31 RX ORDER — COLESEVELAM HYDROCHLORIDE 625 MG/1
625 MG PO TABLET, FILM COATED ORAL TWICE A DAY
Qty: 28 | Refills: 3 | Status: ACTIVE | COMMUNITY
Start: 2022-10-19

## 2025-01-31 NOTE — HPI-TODAY'S VISIT:
74 years-old female presented for follow-up for chronic diarrhea epigastric abdominal pain and history of colon cancer  Subjective:  - Daily nausea occurring in afternoons, typically after lunch - Reports feeling unwell some days, better on others - Abdominal pain: varying locations, both lower abdomen and upper regions, related to surgical scarring - Chronic diarrhea requiring medication for management - Using Lomotil as needed before leaving house, single tablet effective - Recent endoscopy completed with no concerning findings  Past Medical/Surgical History: - Multiple abdominal surgeries with resultant scarring - Partial colectomy (majority of colon removed) - Cardiac condition requiring medication - History of reflux  Medications: - Dexilant (morning with breakfast) - Wellbutrin (morning) - Ramipril (cardiac medication) - Aspirin - Lomotil (as needed for diarrhea)  Objective:  - Abdominal examination performed  Assessment  Plan:  1. Chronic Nausea - Likely medication-related given temporal relationship to medication administration - Plan: - Trial medication timing changes - Consider holding non-essential medications for one week to identify cause - Sample of Voquezna provided as potential alternative to Dexilant - Follow-up in 3 months or sooner via portal if needed  2. Chronic Diarrhea - Related to extensive colonic resection - Plan: - Continue Lomotil as needed - Prescription provided with refills  3. Surveillance - Last colonoscopy: 07/20/2024 - Plan next surveillance end of year or within 1.5-2 years  Additional Notes: - Counseled on medication timing adjustments - Discussed role of dietary factors and stress in symptom exacerbation - No alarming symptoms: no weight loss, progressive pain, or bleeding   . Her primary health concerns are centered around upper gastrointestinal symptoms, with a particular emphasis on nausea and gas. She confirms the absence of blood in her stool, she had 4-5 bowel movement with the nausea she is taking WelChol with mild improvement in her diarrhea Additionally, the patient addresses her need for a new prescription for Dexilant, as she has depleted her current supply. She is exploring the possibility of supplementing Dexilant with another medication to alleviate breakthrough reflux symptoms which has worsened recently especially later in the day. The patient's medical history is significant for chemotherapy and colon cancer, with genetic testing revealing negative results. She also notes that her sister is currently undergoing genetic testing due to health concerns. Despite not experiencing weight loss, she eports tenderness in her upper abdominal area. Her most recent imaging was conducted in the fall,   She had EGD and flexible sigmoidoscopy October 2022 her upper endoscopy revealed evidence of Nissen fundoplication gastritis biopsy no evidence of H. pylori.  Her flexible sigmoidoscopy showed normal ileal sigmoid anastomosis small hyperplastic polyp. She has diagnosis of 2 cancers both presented with iron deficiency anemia first diagnosed in 2018 had right hemicolectomy within July 2021 presented with another mass in the descending colon require subtotal colectomy.  She had chemo therapy to treat the cancer her last dose March 2022 she is following with Dr. Talley no recurrent tumor on imaging.  she had history of Nissen fundoplication and despite treatment with pantoprazole still has that substernal burning and heartburn I

## 2025-02-03 ENCOUNTER — TELEPHONE ENCOUNTER (OUTPATIENT)
Dept: URBAN - METROPOLITAN AREA CLINIC 23 | Facility: CLINIC | Age: 75
End: 2025-02-03

## 2025-02-03 RX ORDER — ONDANSETRON HYDROCHLORIDE 4 MG/1
1 TABLET TABLET, FILM COATED ORAL EVERY 6 HOURS
Qty: 360 TABLET | Refills: 1
Start: 2021-12-15

## 2025-03-27 ENCOUNTER — TELEPHONE ENCOUNTER (OUTPATIENT)
Dept: URBAN - METROPOLITAN AREA CLINIC 23 | Facility: CLINIC | Age: 75
End: 2025-03-27

## 2025-03-27 RX ORDER — DEXLANSOPRAZOLE 60 MG/1
1 CAPSULE CAPSULE, DELAYED RELEASE ORAL ONCE A DAY
Qty: 90 CAPSULE | Refills: 3
Start: 2024-03-15

## 2025-05-01 ENCOUNTER — OFFICE VISIT (OUTPATIENT)
Dept: URBAN - METROPOLITAN AREA CLINIC 12 | Facility: CLINIC | Age: 75
End: 2025-05-01

## 2025-05-01 RX ORDER — ONDANSETRON HYDROCHLORIDE 4 MG/1
1 TABLET TABLET, FILM COATED ORAL EVERY 6 HOURS
Qty: 360 TABLET | Refills: 1 | Status: ACTIVE | COMMUNITY
Start: 2021-12-15

## 2025-05-01 RX ORDER — ATORVASTATIN CALCIUM 20 MG/1
TABLET, FILM COATED ORAL
Qty: 0 | Refills: 0 | Status: ACTIVE | COMMUNITY

## 2025-05-01 RX ORDER — DIPHENOXYLATE HYDROCHLORIDE AND ATROPINE SULFATE 2.5; .025 MG/1; MG/1
1 TABLET AS NEEDED TABLET ORAL
Qty: 360 TABLET | Refills: 2 | Status: ACTIVE | COMMUNITY
Start: 2025-01-31 | End: 2025-10-28

## 2025-05-01 RX ORDER — FAMOTIDINE 20 MG/1
TAKE 1 TABLET BY MOUTH TWICE A DAY TABLET, FILM COATED ORAL
Qty: 180 TABLET | Refills: 2 | Status: ACTIVE | COMMUNITY

## 2025-05-01 RX ORDER — COLESEVELAM HYDROCHLORIDE 625 MG/1
625 MG PO TABLET, FILM COATED ORAL TWICE A DAY
Qty: 28 | Refills: 3 | Status: ACTIVE | COMMUNITY
Start: 2022-10-19

## 2025-05-01 RX ORDER — BUPROPION HCL 300 MG
TAKE 1 TABLET (300 MG) BY ORAL ROUTE ONCE DAILY TABLET, EXTENDED RELEASE 24 HR ORAL 1
Qty: 0 | Refills: 0 | Status: ACTIVE | COMMUNITY

## 2025-05-01 RX ORDER — DEXLANSOPRAZOLE 60 MG/1
TAKE 1 CAPSULE BY MOUTH EVERY DAY CAPSULE, DELAYED RELEASE ORAL
Qty: 90 | Refills: 4 | Status: ACTIVE | COMMUNITY

## 2025-05-01 RX ORDER — PANTOPRAZOLE SODIUM 40 MG/1
TAKE 1 TABLET BY MOUTH TWICE A DAY FOR 90 DAYS TABLET, DELAYED RELEASE ORAL
Qty: 180 TABLET | Refills: 3 | Status: ACTIVE | COMMUNITY

## 2025-05-01 RX ORDER — RAMIPRIL 10 MG/1
CAPSULE ORAL
Qty: 0 | Refills: 0 | Status: ACTIVE | COMMUNITY

## 2025-05-01 RX ORDER — DEXLANSOPRAZOLE 60 MG/1
1 CAPSULE CAPSULE, DELAYED RELEASE ORAL ONCE A DAY
Qty: 90 CAPSULE | Refills: 3 | Status: ACTIVE | COMMUNITY
Start: 2024-03-15

## 2025-05-01 RX ORDER — FAMOTIDINE 20 MG/1
1 TABLET AT BEDTIME TABLET, FILM COATED ORAL ONCE A DAY
Qty: 30 | Refills: 2 | Status: ACTIVE | COMMUNITY
Start: 2022-05-20

## 2025-05-13 ENCOUNTER — OFFICE VISIT (OUTPATIENT)
Dept: URBAN - METROPOLITAN AREA CLINIC 12 | Facility: CLINIC | Age: 75
End: 2025-05-13

## 2025-05-20 ENCOUNTER — OFFICE VISIT (OUTPATIENT)
Dept: URBAN - METROPOLITAN AREA CLINIC 12 | Facility: CLINIC | Age: 75
End: 2025-05-20
Payer: MEDICARE

## 2025-05-20 DIAGNOSIS — R11.0 NAUSEA: ICD-10-CM

## 2025-05-20 DIAGNOSIS — K21.9 GERD: ICD-10-CM

## 2025-05-20 DIAGNOSIS — R19.7 DIARRHEA: ICD-10-CM

## 2025-05-20 DIAGNOSIS — Z85.038 PERSONAL HISTORY OF COLON CANCER: ICD-10-CM

## 2025-05-20 DIAGNOSIS — Z90.49 S/P COLON RESECTION: ICD-10-CM

## 2025-05-20 PROBLEM — 422587007: Status: ACTIVE | Noted: 2025-05-20

## 2025-05-20 PROCEDURE — 99214 OFFICE O/P EST MOD 30 MIN: CPT

## 2025-05-20 RX ORDER — PANTOPRAZOLE SODIUM 40 MG/1
TAKE 1 TABLET BY MOUTH TWICE A DAY FOR 90 DAYS TABLET, DELAYED RELEASE ORAL ONCE A DAY
Qty: 90 TABLET | Refills: 3

## 2025-05-20 RX ORDER — RAMIPRIL 10 MG/1
CAPSULE ORAL
Qty: 0 | Refills: 0 | Status: ACTIVE | COMMUNITY

## 2025-05-20 RX ORDER — DEXLANSOPRAZOLE 60 MG/1
TAKE 1 CAPSULE BY MOUTH EVERY DAY CAPSULE, DELAYED RELEASE ORAL
Qty: 90 | Refills: 4 | Status: ACTIVE | COMMUNITY

## 2025-05-20 RX ORDER — FAMOTIDINE 20 MG/1
TAKE 1 TABLET BY MOUTH TWICE A DAY TABLET, FILM COATED ORAL
Qty: 180 TABLET | Refills: 2 | Status: ACTIVE | COMMUNITY

## 2025-05-20 RX ORDER — ONDANSETRON HYDROCHLORIDE 4 MG/1
1 TABLET TABLET, FILM COATED ORAL ONCE A DAY
Qty: 30 | OUTPATIENT
Start: 2025-05-20

## 2025-05-20 RX ORDER — ONDANSETRON HYDROCHLORIDE 4 MG/1
1 TABLET TABLET, FILM COATED ORAL EVERY 6 HOURS
Qty: 360 TABLET | Refills: 1 | Status: ACTIVE | COMMUNITY
Start: 2021-12-15

## 2025-05-20 RX ORDER — FAMOTIDINE 20 MG/1
TAKE 1 TABLET TABLET, FILM COATED ORAL TWICE A DAY
Qty: 180 | Refills: 1

## 2025-05-20 RX ORDER — DIPHENOXYLATE HYDROCHLORIDE AND ATROPINE SULFATE 2.5; .025 MG/1; MG/1
1 TABLET AS NEEDED TABLET ORAL
Qty: 360 TABLET | Refills: 2
Start: 2025-01-31 | End: 2026-02-14

## 2025-05-20 RX ORDER — PANTOPRAZOLE SODIUM 40 MG/1
TAKE 1 TABLET BY MOUTH TWICE A DAY FOR 90 DAYS TABLET, DELAYED RELEASE ORAL
Qty: 180 TABLET | Refills: 3 | Status: ACTIVE | COMMUNITY

## 2025-05-20 RX ORDER — ATORVASTATIN CALCIUM 20 MG/1
TABLET, FILM COATED ORAL
Qty: 0 | Refills: 0 | Status: ACTIVE | COMMUNITY

## 2025-05-20 RX ORDER — FAMOTIDINE 20 MG/1
1 TABLET AT BEDTIME TABLET, FILM COATED ORAL ONCE A DAY
Qty: 30 | Refills: 2 | Status: ACTIVE | COMMUNITY
Start: 2022-05-20

## 2025-05-20 RX ORDER — DIPHENOXYLATE HYDROCHLORIDE AND ATROPINE SULFATE 2.5; .025 MG/1; MG/1
1 TABLET AS NEEDED TABLET ORAL
Qty: 360 TABLET | Refills: 2 | Status: ACTIVE | COMMUNITY
Start: 2025-01-31 | End: 2025-10-28

## 2025-05-20 RX ORDER — BUPROPION HCL 300 MG
TAKE 1 TABLET (300 MG) BY ORAL ROUTE ONCE DAILY TABLET, EXTENDED RELEASE 24 HR ORAL 1
Qty: 0 | Refills: 0 | Status: ACTIVE | COMMUNITY

## 2025-05-20 NOTE — HPI-TODAY'S VISIT:
Patient is a 75-year-old female w/ hx of colon CA s/p colon resectionpresents today for a follow-up.  She was last seen in the office by Dr. Elmore on 1/31/25 for daily nausea, and intermittent abdominal pain.  Her nausea was thought to be medication related and was advised to hold nonessential medication.  Samples of Voquezna provided as potential alternative to Dexilant.       Flex sig and EGD on 7/10/2024 Dr. Elmore.   Flex sig done with fair bowel prep: Patent end-to-side ileocolonic anastomosis with healthy mucosa.   EGD: evidence of Nissen fundoplication, irregular Z-line, gastritis.  Biopsy showed mild chronic inactive gastritis, chronic inflammation on esophageal biopsy negative for IM.  Next surveillance within 1.5 to 2 years was recommended.   Pt has been taking Pantoprazole 40mg and Pepcid daily and occaiosnally takex Dexilant for acute symptom flare ups, about once a month. She reports Dexilant is not working well as before.  Has not tried samples of Voquezna given at the last visit.  C/o having nausea in the after noon. Zofran prn for nausea with good symptom relief.  She has chornic diarrhea 3-4 times a day related to extensive colonic resection, which she takes Lomotil prn for travelling. No nocturnal symptoms.  No abdominal pain, blood in the stools. Hx of uterine CA 1 year ago s/p hystrectomy followed by Dr. Angelina Talley 1 3-6 months. Patient denies any new concerns or symptoms today.  Request medication refills today.

## 2025-05-27 PROBLEM — 427816007: Status: ACTIVE | Noted: 2025-05-27
